# Patient Record
Sex: MALE | Race: BLACK OR AFRICAN AMERICAN | HISPANIC OR LATINO | Employment: FULL TIME | URBAN - METROPOLITAN AREA
[De-identification: names, ages, dates, MRNs, and addresses within clinical notes are randomized per-mention and may not be internally consistent; named-entity substitution may affect disease eponyms.]

---

## 2017-02-17 ENCOUNTER — OFFICE VISIT (OUTPATIENT)
Dept: UROLOGY | Facility: CLINIC | Age: 27
End: 2017-02-17
Payer: COMMERCIAL

## 2017-02-17 VITALS
DIASTOLIC BLOOD PRESSURE: 65 MMHG | TEMPERATURE: 99 F | HEART RATE: 70 BPM | BODY MASS INDEX: 29.25 KG/M2 | SYSTOLIC BLOOD PRESSURE: 119 MMHG | HEIGHT: 66 IN | WEIGHT: 182 LBS

## 2017-02-17 DIAGNOSIS — R06.02 SHORTNESS OF BREATH: ICD-10-CM

## 2017-02-17 DIAGNOSIS — R30.0 DYSURIA: ICD-10-CM

## 2017-02-17 DIAGNOSIS — N53.19 EJACULATORY DISORDER: ICD-10-CM

## 2017-02-17 DIAGNOSIS — N50.819 ORCHALGIA: ICD-10-CM

## 2017-02-17 DIAGNOSIS — N32.81 OAB (OVERACTIVE BLADDER): Primary | ICD-10-CM

## 2017-02-17 PROCEDURE — 99204 OFFICE O/P NEW MOD 45 MIN: CPT | Mod: S$GLB,,, | Performed by: UROLOGY

## 2017-02-17 PROCEDURE — 87086 URINE CULTURE/COLONY COUNT: CPT

## 2017-02-17 PROCEDURE — 87591 N.GONORRHOEAE DNA AMP PROB: CPT

## 2017-02-17 PROCEDURE — 99999 PR PBB SHADOW E&M-EST. PATIENT-LVL III: CPT | Mod: PBBFAC,,, | Performed by: UROLOGY

## 2017-02-17 RX ORDER — PHENAZOPYRIDINE HYDROCHLORIDE 95 MG/1
95 TABLET ORAL 3 TIMES DAILY PRN
COMMUNITY

## 2017-02-17 RX ORDER — OXYBUTYNIN CHLORIDE 5 MG/1
5 TABLET ORAL 3 TIMES DAILY
Qty: 90 TABLET | Refills: 12 | Status: SHIPPED | OUTPATIENT
Start: 2017-02-17 | End: 2017-02-17 | Stop reason: SDUPTHER

## 2017-02-17 RX ORDER — OXYBUTYNIN CHLORIDE 5 MG/1
5 TABLET ORAL 3 TIMES DAILY
Qty: 90 TABLET | Refills: 12 | Status: SHIPPED | OUTPATIENT
Start: 2017-02-17 | End: 2017-03-31 | Stop reason: SDUPTHER

## 2017-02-17 NOTE — PROGRESS NOTES
HPI:  Zulay Vanegas is a 26 y.o. year old male that  presents with No chief complaint on file.  .  This patient self refers with bladder issues wanting to be cystoscoped.  Patient speaks no English and communication is a with the help of  Catia the .  Patient was seen in the emergency room in August of last year with pain and gastritis dysuria and it was recommended that he proceed with cystoscopy.  He also states he was seen at [facility] and also cystoscopy was recommended.  CT scan from August of last year is reviewed which shows normal kidneys.  This image is reviewed by me and I agree that his kidneys appear normal    Patient's chief complaint is frequency and nocturia.  He states that sometimes he can void up to 50 times per day.  He states that this is been going on for 8 months.  He has occasional dysuria and no fevers or chills.  He denies gross hematuria or history of urinary tract infection.  Patient brings in a picture of a prescription for tamsulosin which he states he tried but which did not help his voiding symptoms.  His girlfriend states that oftentimes his symptoms are made worse with certain foods including alcohol and coffee.    Complains of an 8 month history of intermittent fleeting pain that goes one testicle to the other.  It is not associated with swelling    Also complains of intermittent pain with ejaculation and also intermittent lack of ejaculation.     Chart review shows a GFR greater than 60 in August of last year with a negative urine culture at that time.  Patient brings in recent TD testing which is negative for GC RPR and  hepatitis      Past Medical History   Diagnosis Date    Allergy      currently has a rash but does not know where it comes from    Cervical strain     Elevated PSA     Gastritis      Social History     Social History    Marital status: Single     Spouse name: N/A    Number of children: N/A    Years of education: N/A  "    Occupational History    Not on file.     Social History Main Topics    Smoking status: Former Smoker    Smokeless tobacco: Never Used      Comment: 2 months ago    Alcohol use No    Drug use: No    Sexual activity: Yes     Partners: Female     Birth control/ protection: None     Other Topics Concern    Not on file     Social History Narrative     History reviewed. No pertinent past surgical history.  Family History   Problem Relation Age of Onset    No Known Problems Mother     No Known Problems Father     Prostate cancer Neg Hx     Kidney disease Neg Hx            Review of Systems  The patient has no chest pains.  The patient has  shortness of breath  Patient wears glasses.  All other review of systems are negative.      Physical Exam:  Visit Vitals    /65    Pulse 70    Temp 98.5 °F (36.9 °C)    Ht 5' 6" (1.676 m)    Wt 82.6 kg (182 lb)    BMI 29.38 kg/m2     General appearance: alert, cooperative, no distress  Constitutional:Oriented to person, place, and time.appears well-developed and well-nourished.   HEENT: Normocephalic, atraumatic, neck symmetrical, no nasal discharge   Eyes: conjunctivae/corneas clear, PERRL, EOM's intact  Lungs: clear to auscultation bilaterally, no dullness to percussion bilaterally  Heart: regular rate and rhythm without rub; no displacement of the PMI   Abdomen: soft, non-tender; bowel sounds normoactive; no organomegaly  :Penis/perineum without lesions, scrotum without rash/cysts, epididymis nontender bilaterally, urethral meatus in normal location normal size, no penile plaques palpated, prostate smoothly enlarged, no nodules, seminal vesicles not palpated.  No rectal masses, sphincter tone normal.  Testes equal in size without masses  Extremities: extremities symmetric; no clubbing, cyanosis, or edema  Integument: Skin color, texture, turgor normal; no rashes; hair distrubution normal  Neurologic: Alert and oriented X 3, normal strength, normal " coordination and gait  Psychiatric: no pressured speech; normal affect; no evidence of impaired cognition     LABS:    Complete Blood Count  Lab Results   Component Value Date    RBC 4.44 (L) 08/16/2016    HGB 13.8 (L) 08/16/2016    HCT 40.8 08/16/2016    MCV 92 08/16/2016    MCH 31.1 (H) 08/16/2016    MCHC 33.8 08/16/2016    RDW 12.8 08/16/2016     08/16/2016    MPV 10.3 08/16/2016    GRAN 2.7 08/16/2016    GRAN 57.5 08/16/2016    LYMPH 1.5 08/16/2016    LYMPH 32.4 08/16/2016    MONO 0.4 08/16/2016    MONO 9.0 08/16/2016    EOS 0.0 08/16/2016    BASO 0.01 08/16/2016    EOSINOPHIL 0.9 08/16/2016    BASOPHIL 0.2 08/16/2016    DIFFMETHOD Automated 08/16/2016       Comprehensive Metabolic Panel  Lab Results   Component Value Date     08/16/2016    BUN 14 08/16/2016    CREATININE 1.1 08/16/2016     08/16/2016    K 3.8 08/16/2016     08/16/2016    PROT 7.2 08/16/2016    ALBUMIN 3.8 08/16/2016    BILITOT 0.2 08/16/2016    AST 15 08/16/2016    ALKPHOS 98 08/16/2016    CO2 27 08/16/2016    ALT 14 08/16/2016    ANIONGAP 10 08/16/2016    EGFRNONAA >60 08/16/2016    ESTGFRAFRICA >60 08/16/2016       PSA  No results found for: PSA      Assessment:    ICD-10-CM ICD-9-CM    1. OAB (overactive bladder) N32.81 596.51 Cystoscopy   2. Dysuria R30.0 788.1 Urine culture      C. trachomatis/N. gonorrhoeae by AMP DNA Urethra   3. Orchalgia N50.819 608.9    4. Ejaculatory disorder N53.19 608.89    5. Shortness of breath R06.02 786.05      The primary encounter diagnosis was OAB (overactive bladder). Diagnoses of Dysuria, Orchalgia, Ejaculatory disorder, and Shortness of breath were also pertinent to this visit.      Plan: Impression 1 Probable overactive bladder versus psychogenic voiding dysfunction.  I discussed at length in detail with the patient and his girlfriend that in his young age, most likely his symptoms are psychogenic.  Patient does not believe that this is so and wants cystoscopy which will be  performed at next available time slot.  Discussed risk and benefits of anticholinergic and patient wants to give it a try.Prescription for oxybutynin 5 mg is given to the patient.  Possible side effects including dry mouth, constipation, and difficulty voiding were discussed.  Patient will titrate to up to a maximum 5 mg 3 times a day and adjust downward as needed according to side effects.  Patient voices understanding    #2 dysuria, intermittent.  Urine will be cultured and we'll contact the patient with any significant finding.  Patient requests urethral swab test for chlamydia which is done    #3 testicular pain.Testicular pain.  Plan.  I discussed at length and in detail with the patient that it is often difficult to explain the exact cause of testicular pain.  I reassured the patient that his exam shows no masses.  I recommend that he use Tylenol or Motrin and consider using a jockstrap or jockey type underwear.    #4 intermittent ejaculatory symptoms.  I discussed with patient that these are most likely psychogenic in origin and that I cannot otherwise explain them.  No specific therapy for this entity    #5 shortness of breath.  Plan.  Patient recommended follow-up with his PCP concerning this  Orders Placed This Encounter   Procedures    Cystoscopy    Urine culture    C. trachomatis/N. gonorrhoeae by AMP DNA Urethra           Alex Valentin MD

## 2017-02-19 LAB — BACTERIA UR CULT: NO GROWTH

## 2017-02-20 LAB
C TRACH DNA SPEC QL NAA+PROBE: NEGATIVE
N GONORRHOEA DNA SPEC QL NAA+PROBE: NEGATIVE

## 2017-03-31 ENCOUNTER — PROCEDURE VISIT (OUTPATIENT)
Dept: UROLOGY | Facility: CLINIC | Age: 27
End: 2017-03-31
Payer: COMMERCIAL

## 2017-03-31 VITALS
HEIGHT: 66 IN | HEART RATE: 69 BPM | BODY MASS INDEX: 29.25 KG/M2 | TEMPERATURE: 98 F | WEIGHT: 182 LBS | DIASTOLIC BLOOD PRESSURE: 79 MMHG | SYSTOLIC BLOOD PRESSURE: 131 MMHG

## 2017-03-31 DIAGNOSIS — N32.81 OAB (OVERACTIVE BLADDER): ICD-10-CM

## 2017-03-31 DIAGNOSIS — R30.0 DYSURIA: Primary | ICD-10-CM

## 2017-03-31 PROCEDURE — 52000 CYSTOURETHROSCOPY: CPT | Mod: S$GLB,,, | Performed by: UROLOGY

## 2017-03-31 PROCEDURE — 99214 OFFICE O/P EST MOD 30 MIN: CPT | Mod: 25,S$GLB,, | Performed by: UROLOGY

## 2017-03-31 RX ORDER — PHENAZOPYRIDINE HYDROCHLORIDE 200 MG/1
200 TABLET, FILM COATED ORAL 3 TIMES DAILY PRN
Qty: 30 TABLET | Refills: 1 | Status: SHIPPED | OUTPATIENT
Start: 2017-03-31 | End: 2017-04-10

## 2017-03-31 RX ORDER — OXYBUTYNIN CHLORIDE 5 MG/1
5 TABLET ORAL 3 TIMES DAILY
Qty: 90 TABLET | Refills: 12 | Status: SHIPPED | OUTPATIENT
Start: 2017-03-31 | End: 2017-04-30

## 2017-03-31 NOTE — PROCEDURES
Procedures Procedures: Flexible cystourethroscopy    Pre Procedure Diagnosis: Overactive bladder    Post Procedure Diagnosis: Same    Date of Procedure.  mArch 31st 2017    Indications.  This gentleman with daytime frequency up to 50 presents now for evaluation.  She has had an IVP which was normal    Surgeon: Alex Valentin MD    Anesthesia: 2% uro-jet lidocaine jelly for local analgesia    Flexible cysto-urethroscopy was performed after consent was obtained.    2% lidocaine urojet was used for local analgesia.  The genitalia were prepped and draped in the usual sterile fashion.    The flexible scope was advanced into the urethra and into the bladder.  Bilateral ureteral orifices were evaluated and noted to be normal with clear efflux.  The bladder was completely surveyed in a systematic fashion.   No bladder tumors or lesions were seen.  No strictures were noted.  The prostate showed no evidence of obstruction    The patient tolerated the procedure well without complication.    Impression overactive bladder.  Patient has been tried on oxybutynin which helps somewhat.  He still has significant frequency.  Oxybutynin is renewed.  I discussed at length and in detail with the patient and his girlfriend that his urologic workup has been normal.  Discussed that was my feeling that his symptoms are most likely psychogenic in origin and that he see either a psychologist or psychiatrist.  Patient and his girlfriend voice understanding of my impression and they state they might seek a urologic second opinion.  Follow-up with me on an as-needed basis    #2 intermittent dysuria.  Plan.  I discussed that his urine culture has been negative.  Prescription for Pyridium given.  Discussion and recommendation as detailed above.    Patient had girlfriend speaks minimal English and communication is helped with the [facility]. Today I spent 25 minutes with the patient, more than 50% of which was spent counseling  "and discussion as detailed above.    Physical exam reveals reveals a well-developed well-nourished patient  in no acute distress.  Patient is alert and oriented ×3 with normal mood and affect.  Respiratory effort is normal and there is no peripheral edema.  Skin is normal to inspection and palpation.Penis/perineum without lesions, scrotum without rash/cysts, epididymis nontender bilaterally, urethral meatus in normal location normal size, no penile plaques palpated, prostate smoothly enlarged, no nodules, seminal vesicles not palpated.  No rectal masses, sphincter tone normal.  Testes equal in size without masses    /79  Pulse 69  Temp 98.2 °F (36.8 °C)  Ht 5' 6" (1.676 m)  Wt 82.6 kg (182 lb)  BMI 29.38 kg/m2  Review of Systems  General ROS: negative for chills, fever or weight loss  Respiratory ROS: no cough, shortness of breath, or wheezing  Cardiovascular ROS: no chest pain or dyspnea on exertion  Musculoskeletal ROS: negative for gait disturbance or muscular weakness    Family History   Problem Relation Age of Onset    No Known Problems Mother     No Known Problems Father     Prostate cancer Neg Hx     Kidney disease Neg Hx      Past Medical History:   Diagnosis Date    Allergy     currently has a rash but does not know where it comes from    Cervical strain     Elevated PSA     Gastritis      Family History   Problem Relation Age of Onset    No Known Problems Mother     No Known Problems Father     Prostate cancer Neg Hx     Kidney disease Neg Hx      Social History   Substance Use Topics    Smoking status: Former Smoker    Smokeless tobacco: Never Used      Comment: 2 months ago    Alcohol use No       Impression:      ICD-10-CM ICD-9-CM    1. Dysuria R30.0 788.1    2. OAB (overactive bladder) N32.81 596.51 Cystoscopy                 "

## 2021-08-23 ENCOUNTER — HOSPITAL ENCOUNTER (EMERGENCY)
Facility: HOSPITAL | Age: 31
Discharge: HOME | End: 2021-08-23
Attending: EMERGENCY MEDICINE
Payer: MEDICAID

## 2021-08-23 VITALS
WEIGHT: 210 LBS | DIASTOLIC BLOOD PRESSURE: 80 MMHG | BODY MASS INDEX: 32.96 KG/M2 | RESPIRATION RATE: 20 BRPM | HEART RATE: 110 BPM | OXYGEN SATURATION: 99 % | TEMPERATURE: 98 F | HEIGHT: 67 IN | SYSTOLIC BLOOD PRESSURE: 130 MMHG

## 2021-08-23 DIAGNOSIS — U07.1 COVID-19 VIRUS DETECTED: ICD-10-CM

## 2021-08-23 DIAGNOSIS — U07.1 COVID-19: ICD-10-CM

## 2021-08-23 DIAGNOSIS — R05.9 COUGH: Primary | ICD-10-CM

## 2021-08-23 LAB
CTP QC/QA: YES
SARS-COV-2 RDRP RESP QL NAA+PROBE: POSITIVE

## 2021-08-23 PROCEDURE — 25000003 PHARM REV CODE 250: Performed by: EMERGENCY MEDICINE

## 2021-08-23 PROCEDURE — U0002 COVID-19 LAB TEST NON-CDC: HCPCS | Performed by: PHYSICIAN ASSISTANT

## 2021-08-23 PROCEDURE — 99284 EMERGENCY DEPT VISIT MOD MDM: CPT | Mod: 25

## 2021-08-23 RX ORDER — ONDANSETRON 4 MG/1
4 TABLET, FILM COATED ORAL EVERY 6 HOURS
Qty: 20 TABLET | Refills: 0 | Status: SHIPPED | OUTPATIENT
Start: 2021-08-23

## 2021-08-23 RX ORDER — ALBUTEROL SULFATE 90 UG/1
1-2 AEROSOL, METERED RESPIRATORY (INHALATION) EVERY 6 HOURS PRN
Qty: 8 G | Refills: 0 | Status: SHIPPED | OUTPATIENT
Start: 2021-08-23 | End: 2022-08-23

## 2021-08-23 RX ORDER — BENZONATATE 100 MG/1
100 CAPSULE ORAL ONCE
Status: COMPLETED | OUTPATIENT
Start: 2021-08-23 | End: 2021-08-23

## 2021-08-23 RX ORDER — BENZONATATE 100 MG/1
100 CAPSULE ORAL 3 TIMES DAILY PRN
Qty: 20 CAPSULE | Refills: 0 | Status: SHIPPED | OUTPATIENT
Start: 2021-08-23 | End: 2021-09-02

## 2021-08-23 RX ADMIN — BENZONATATE 100 MG: 100 CAPSULE ORAL at 12:08

## 2022-12-07 ENCOUNTER — HOSPITAL ENCOUNTER (EMERGENCY)
Facility: HOSPITAL | Age: 32
Discharge: HOME | End: 2022-12-07
Attending: STUDENT IN AN ORGANIZED HEALTH CARE EDUCATION/TRAINING PROGRAM
Payer: MEDICAID

## 2022-12-07 VITALS
DIASTOLIC BLOOD PRESSURE: 85 MMHG | WEIGHT: 224 LBS | BODY MASS INDEX: 35.08 KG/M2 | RESPIRATION RATE: 17 BRPM | OXYGEN SATURATION: 97 % | TEMPERATURE: 98 F | HEART RATE: 66 BPM | SYSTOLIC BLOOD PRESSURE: 127 MMHG

## 2022-12-07 DIAGNOSIS — R00.2 PALPITATIONS: ICD-10-CM

## 2022-12-07 DIAGNOSIS — N17.9 AKI (ACUTE KIDNEY INJURY): Primary | ICD-10-CM

## 2022-12-07 DIAGNOSIS — R00.2 PALPITATION: ICD-10-CM

## 2022-12-07 LAB
ALBUMIN SERPL BCP-MCNC: 3.9 G/DL (ref 3.5–5.2)
ALP SERPL-CCNC: 96 U/L (ref 55–135)
ALT SERPL W/O P-5'-P-CCNC: 53 U/L (ref 10–44)
ANION GAP SERPL CALC-SCNC: 10 MMOL/L (ref 8–16)
AST SERPL-CCNC: 27 U/L (ref 10–40)
BASOPHILS # BLD AUTO: 0.03 K/UL (ref 0–0.2)
BASOPHILS NFR BLD: 0.4 % (ref 0–1.9)
BILIRUB SERPL-MCNC: 0.3 MG/DL (ref 0.1–1)
BILIRUB UR QL STRIP: NEGATIVE
BUN SERPL-MCNC: 17 MG/DL (ref 6–20)
CALCIUM SERPL-MCNC: 9.4 MG/DL (ref 8.7–10.5)
CHLORIDE SERPL-SCNC: 106 MMOL/L (ref 95–110)
CLARITY UR: CLEAR
CO2 SERPL-SCNC: 24 MMOL/L (ref 23–29)
COLOR UR: YELLOW
CREAT SERPL-MCNC: 1.5 MG/DL (ref 0.5–1.4)
DIFFERENTIAL METHOD: NORMAL
EOSINOPHIL # BLD AUTO: 0 K/UL (ref 0–0.5)
EOSINOPHIL NFR BLD: 0.6 % (ref 0–8)
ERYTHROCYTE [DISTWIDTH] IN BLOOD BY AUTOMATED COUNT: 12.2 % (ref 11.5–14.5)
EST. GFR  (NO RACE VARIABLE): >60 ML/MIN/1.73 M^2
GLUCOSE SERPL-MCNC: 110 MG/DL (ref 70–110)
GLUCOSE UR QL STRIP: NEGATIVE
HCT VFR BLD AUTO: 43.5 % (ref 40–54)
HGB BLD-MCNC: 14.6 G/DL (ref 14–18)
HGB UR QL STRIP: NEGATIVE
IMM GRANULOCYTES # BLD AUTO: 0.02 K/UL (ref 0–0.04)
IMM GRANULOCYTES NFR BLD AUTO: 0.3 % (ref 0–0.5)
KETONES UR QL STRIP: NEGATIVE
LEUKOCYTE ESTERASE UR QL STRIP: NEGATIVE
LYMPHOCYTES # BLD AUTO: 3.1 K/UL (ref 1–4.8)
LYMPHOCYTES NFR BLD: 44.4 % (ref 18–48)
MCH RBC QN AUTO: 30.4 PG (ref 27–31)
MCHC RBC AUTO-ENTMCNC: 33.6 G/DL (ref 32–36)
MCV RBC AUTO: 91 FL (ref 82–98)
MONOCYTES # BLD AUTO: 0.5 K/UL (ref 0.3–1)
MONOCYTES NFR BLD: 6.5 % (ref 4–15)
NEUTROPHILS # BLD AUTO: 3.3 K/UL (ref 1.8–7.7)
NEUTROPHILS NFR BLD: 47.8 % (ref 38–73)
NITRITE UR QL STRIP: NEGATIVE
NRBC BLD-RTO: 0 /100 WBC
PH UR STRIP: 7 [PH] (ref 5–8)
PLATELET # BLD AUTO: 260 K/UL (ref 150–450)
PMV BLD AUTO: 10.1 FL (ref 9.2–12.9)
POTASSIUM SERPL-SCNC: 3.8 MMOL/L (ref 3.5–5.1)
PROT SERPL-MCNC: 7.7 G/DL (ref 6–8.4)
PROT UR QL STRIP: ABNORMAL
RBC # BLD AUTO: 4.8 M/UL (ref 4.6–6.2)
SODIUM SERPL-SCNC: 140 MMOL/L (ref 136–145)
SP GR UR STRIP: 1.03 (ref 1–1.03)
T4 FREE SERPL-MCNC: 0.94 NG/DL (ref 0.71–1.51)
TSH SERPL DL<=0.005 MIU/L-ACNC: 4.38 UIU/ML (ref 0.4–4)
URN SPEC COLLECT METH UR: ABNORMAL
UROBILINOGEN UR STRIP-ACNC: NEGATIVE EU/DL
WBC # BLD AUTO: 6.91 K/UL (ref 3.9–12.7)

## 2022-12-07 PROCEDURE — 93010 EKG 12-LEAD: ICD-10-PCS | Mod: ,,, | Performed by: INTERNAL MEDICINE

## 2022-12-07 PROCEDURE — 93010 ELECTROCARDIOGRAM REPORT: CPT | Mod: ,,, | Performed by: INTERNAL MEDICINE

## 2022-12-07 PROCEDURE — 81003 URINALYSIS AUTO W/O SCOPE: CPT | Performed by: STUDENT IN AN ORGANIZED HEALTH CARE EDUCATION/TRAINING PROGRAM

## 2022-12-07 PROCEDURE — 84439 ASSAY OF FREE THYROXINE: CPT | Performed by: PHYSICIAN ASSISTANT

## 2022-12-07 PROCEDURE — 25000003 PHARM REV CODE 250: Performed by: STUDENT IN AN ORGANIZED HEALTH CARE EDUCATION/TRAINING PROGRAM

## 2022-12-07 PROCEDURE — 84443 ASSAY THYROID STIM HORMONE: CPT | Performed by: PHYSICIAN ASSISTANT

## 2022-12-07 PROCEDURE — 85025 COMPLETE CBC W/AUTO DIFF WBC: CPT | Performed by: PHYSICIAN ASSISTANT

## 2022-12-07 PROCEDURE — 93005 ELECTROCARDIOGRAM TRACING: CPT

## 2022-12-07 PROCEDURE — 96360 HYDRATION IV INFUSION INIT: CPT

## 2022-12-07 PROCEDURE — 99285 EMERGENCY DEPT VISIT HI MDM: CPT | Mod: 25

## 2022-12-07 PROCEDURE — 80053 COMPREHEN METABOLIC PANEL: CPT | Performed by: PHYSICIAN ASSISTANT

## 2022-12-07 RX ADMIN — SODIUM CHLORIDE 1000 ML: 0.9 INJECTION, SOLUTION INTRAVENOUS at 10:12

## 2022-12-08 NOTE — ED PROVIDER NOTES
Encounter Date: 12/7/2022       History     Chief Complaint   Patient presents with    Palpitations     Patient reports heart palpitations x 2 episodes. First episode was last night. Woke him out of his sleep. 2nd episode was when he was laying down tonight. Denies cardiac history. Denies hx of anxiety. Denies SOB or N/V when palpitations are present. Denies any increase in caffeine consumption. Denies drug use. Social drinker.      32-year-old male with no significant past medical history presents with palpitations and yesterday.  He reports 1st episode occurred while he was sleeping yesterday night awoke and with the heart racing.  At the time he got up and walked and symptoms improved.  He reports a similar episode occurred today while lying down so presented to the emergency department for evaluation.  Denies any chest pain during the episodes.  Denies any shortness of breath nausea or vomiting.  Denies any prior history of similar symptoms.  He states a week ago being nausea vomiting and diarrhea for approximately 3-4 a self-resolved.  It was a viral infection as his son had similar symptoms.  Currently denies any dysuria or hematuria.  Denies any fevers or chills.  Denies any illicit drug use.    The history is limited by a language barrier. A  was used ([id number]).   Review of patient's allergies indicates:  No Known Allergies  Past Medical History:   Diagnosis Date    Allergy     currently has a rash but does not know where it comes from    Cervical strain     Elevated PSA     Gastritis      No past surgical history on file.  Family History   Problem Relation Age of Onset    No Known Problems Mother     No Known Problems Father     Prostate cancer Neg Hx     Kidney disease Neg Hx      Social History     Tobacco Use    Smoking status: Former    Smokeless tobacco: Never    Tobacco comments:     2 months ago   Substance Use Topics    Alcohol use: No    Drug use: No     Review of Systems    Constitutional:  Negative for chills and fever.   HENT:  Negative for congestion and rhinorrhea.    Eyes:  Negative for pain.   Respiratory:  Negative for cough and shortness of breath.    Cardiovascular:  Positive for palpitations. Negative for chest pain and leg swelling.   Gastrointestinal:  Negative for abdominal pain, nausea and vomiting.   Endocrine: Negative for polyuria.   Genitourinary:  Negative for dysuria and hematuria.   Musculoskeletal:  Negative for gait problem and neck pain.   Skin:  Negative for rash.   Allergic/Immunologic: Negative for immunocompromised state.   Neurological:  Positive for dizziness. Negative for weakness and headaches.     Physical Exam     Initial Vitals   BP Pulse Resp Temp SpO2   12/07/22 2039 12/07/22 2039 12/07/22 2039 12/07/22 2045 12/07/22 2039   (!) 168/94 72 18 98.2 °F (36.8 °C) 99 %      MAP       --                Physical Exam    Constitutional: He appears well-developed and well-nourished. He is not diaphoretic. No distress.   HENT:   Head: Normocephalic and atraumatic.   Eyes: Conjunctivae and EOM are normal. Pupils are equal, round, and reactive to light.   Neck: No JVD present.   Normal range of motion.  Cardiovascular:  Normal rate and regular rhythm.           Pulses:       Radial pulses are 2+ on the right side and 2+ on the left side.        Posterior tibial pulses are 2+ on the right side and 2+ on the left side.   Pulmonary/Chest: Breath sounds normal. No respiratory distress.   Abdominal: Abdomen is soft. Bowel sounds are normal. He exhibits no distension. There is no abdominal tenderness. There is no rebound and no guarding.   Musculoskeletal:         General: No tenderness or edema. Normal range of motion.      Cervical back: Normal range of motion.     Lymphadenopathy:     He has no cervical adenopathy.   Neurological: He is alert and oriented to person, place, and time.   Skin: Skin is warm. Capillary refill takes less than 2 seconds.   Psychiatric:  He has a normal mood and affect.       ED Course   Procedures  Labs Reviewed   COMPREHENSIVE METABOLIC PANEL - Abnormal; Notable for the following components:       Result Value    Creatinine 1.5 (*)     ALT 53 (*)     All other components within normal limits   TSH - Abnormal; Notable for the following components:    TSH 4.377 (*)     All other components within normal limits   URINALYSIS, REFLEX TO URINE CULTURE - Abnormal; Notable for the following components:    Protein, UA Trace (*)     All other components within normal limits    Narrative:     Specimen Source->Urine   CBC W/ AUTO DIFFERENTIAL   T4, FREE     EKG Readings: (Independently Interpreted)   EKG obtained sinus rhythm with a ventricular rate of 69. No Signs of ischemia.  Normal intervals.     Imaging Results              X-Ray Chest AP Portable (Final result)  Result time 12/07/22 23:02:52      Final result by Bulmaro Mcdonald MD (12/07/22 23:02:52)                   Impression:      No acute process.      Electronically signed by: Bulmaro Mcdonald MD  Date:    12/07/2022  Time:    23:02               Narrative:    EXAMINATION:  XR CHEST AP PORTABLE    CLINICAL HISTORY:  Palpitations    TECHNIQUE:  Single frontal view of the chest was performed.    COMPARISON:  08/23/2021.    FINDINGS:  Monitoring EKG leads are present.  The trachea is unremarkable. The cardiomediastinal silhouette is within normal limits.  The hemidiaphragms are unremarkable.  There are no pleural effusions.  There is no evidence of a pneumothorax.  There is no evidence of pneumomediastinum.  No airspace opacity is present.  The osseous structures are unremarkable.                                       Medications   sodium chloride 0.9% bolus 1,000 mL (0 mLs Intravenous Stopped 12/7/22 7880)     Medical Decision Making:   Initial Assessment:   32-year-old male with no significant past medical history presents with palpitations and yesterday.  Patient no acute distress, EKG without any signs  of ischemia or arrhythmia.   Exam without any signs of volume overload.  Symptoms may be secondary to dehydration given recent diagnosis of acute gastroenteritis versus panic attack.  Differential for palpitations include cardiopulmonary causes such as ischemia, PE, pneumothorax, and pneumonia,   Plan: labs, EKG, CXR, troponin  Clinical Tests:   Lab Tests: Ordered and Reviewed  Radiological Study: Reviewed and Ordered  Medical Tests: Ordered and Reviewed           ED Course as of 12/08/22 0140   Wed Dec 07, 2022   2320 CBC without significant leukocytosis, anemia, or platelet abnormalities.  Chemistry notable for creatinine of 1.5 likely due to dehydration given patient had a viral gastroenteritis a week ago.  And have patient follow-up with PCP. Pt is currently stable for discharge. I discussed with the patient/family the diagnosis, treatment plan, indications for return to the emergency department, and for expected follow-up. The patient/family verbalized an understanding. The patient/family is asked if there are any questions or concerns. We discuss the case, until all issues are addressed to the patient/family's satisfaction. Patient/family understands and is agreeable to the plan.   [AS]      ED Course User Index  [AS] Shayla De La O MD                 Clinical Impression:   Final diagnoses:  [R00.2] Palpitations  [R00.2] Palpitation  [N17.9] KYLE (acute kidney injury) (Primary)        ED Disposition Condition    Discharge Stable          ED Prescriptions    None       Follow-up Information       Follow up With Specialties Details Why Contact Info    Primary care doctor   For re-evaluation of creatinine              Shayla De La O MD  12/08/22 0140

## 2022-12-08 NOTE — FIRST PROVIDER EVALUATION
Emergency Department TeleTriage Encounter Note      CHIEF COMPLAINT    Chief Complaint   Patient presents with    Palpitations     Patient reports heart palpitations x 2 episodes. First episode was last night. Woke him out of his sleep. 2nd episode was when he was laying down tonight. Denies cardiac history. Denies hx of anxiety. Denies SOB or N/V when palpitations are present. Denies any increase in caffeine consumption. Denies drug use. Social drinker.        VITAL SIGNS   Initial Vitals   BP Pulse Resp Temp SpO2   12/07/22 2039 12/07/22 2039 12/07/22 2039 12/07/22 2045 12/07/22 2039   (!) 168/94 72 18 98.2 °F (36.8 °C) 99 %      MAP       --                   ALLERGIES    Review of patient's allergies indicates:  No Known Allergies    PROVIDER TRIAGE NOTE  This is a teletriage evaluation of a 32 y.o. male presenting to the ED complaining of palpitations.     Initial orders will be placed and care will be transferred to an alternate provider when patient is roomed for a full evaluation. Any additional orders and the final disposition will be determined by that provider.         ORDERS  Labs Reviewed   CBC W/ AUTO DIFFERENTIAL   COMPREHENSIVE METABOLIC PANEL   TSH       ED Orders (720h ago, onward)      Start Ordered     Status Ordering Provider    12/07/22 2117 12/07/22 2116  Pulse Oximetry Continuous  Continuous         Ordered VALERIE PARR    12/07/22 2116 12/07/22 2116  Saline lock IV  Once         Ordered VALERIE PARR.    12/07/22 2116 12/07/22 2116  CBC auto differential  STAT         Ordered VALERIE PARR.    12/07/22 2116 12/07/22 2116  Comprehensive metabolic panel  STAT         Ordered VALERIE PARR.    12/07/22 2116 12/07/22 2116  TSH  STAT         Ordered VALERIE PARR.    12/07/22 2116 12/07/22 2116  Cardiac Monitoring - Adult  Continuous        Comments: Notify Physician If:    Ordered VALERIE PARR    12/07/22  2046 12/07/22 2045  EKG 12-lead  Once         Completed by ANTONIO PARADA on 12/7/2022 at  8:46 PM AMELIA YOUSSEF              Virtual Visit Note: The provider triage portion of this emergency department evaluation and documentation was performed via [employer], a HIPAA-compliant telemedicine application, in concert with a tele-presenter in the room. A face to face patient evaluation with one of my colleagues will occur once the patient is placed in an emergency department room.      DISCLAIMER: This note was prepared with [employer] voice recognition transcription software. Garbled syntax, mangled pronouns, and other bizarre constructions may be attributed to that software system.

## 2022-12-08 NOTE — DISCHARGE INSTRUCTIONS
Thank you for coming to our Emergency Department today. It is important to remember that some problems are difficult to diagnose and may not be found during your first visit. Be sure to follow up with your primary care doctor and review any labs/imaging that was performed with them. If you do not have a primary care doctor, you may contact the one listed on your discharge paperwork or you may also call the [facility] Appointment Desk at [phone] to schedule an appointment with one.     All medications may potentially have side effects and it is impossible to predict which medications may give you side effects. If you feel that you are having a negative effect of any medication you should immediately stop taking them and seek medical attention.    Return to the ER with any questions/concerns, new/concerning symptoms, worsening or failure to improve. Do not drive or make any important decisions for 24 hours if you have received any pain medications, sedatives or mood altering drugs during your ER visit.

## 2024-10-07 ENCOUNTER — HOSPITAL ENCOUNTER (EMERGENCY)
Facility: HOSPITAL | Age: 34
Discharge: HOME | End: 2024-10-07
Attending: EMERGENCY MEDICINE

## 2024-10-07 VITALS
BODY MASS INDEX: 28.41 KG/M2 | WEIGHT: 181 LBS | OXYGEN SATURATION: 98 % | TEMPERATURE: 98 F | HEIGHT: 67 IN | DIASTOLIC BLOOD PRESSURE: 88 MMHG | HEART RATE: 60 BPM | SYSTOLIC BLOOD PRESSURE: 134 MMHG | RESPIRATION RATE: 17 BRPM

## 2024-10-07 DIAGNOSIS — G43.909 MIGRAINE WITHOUT STATUS MIGRAINOSUS, NOT INTRACTABLE, UNSPECIFIED MIGRAINE TYPE: Primary | ICD-10-CM

## 2024-10-07 PROCEDURE — 99284 EMERGENCY DEPT VISIT MOD MDM: CPT | Mod: 25

## 2024-10-07 PROCEDURE — 63600175 PHARM REV CODE 636 W HCPCS: Performed by: PHYSICIAN ASSISTANT

## 2024-10-07 PROCEDURE — 25000003 PHARM REV CODE 250: Performed by: PHYSICIAN ASSISTANT

## 2024-10-07 PROCEDURE — 96372 THER/PROPH/DIAG INJ SC/IM: CPT | Performed by: PHYSICIAN ASSISTANT

## 2024-10-07 RX ORDER — DIPHENHYDRAMINE HCL 25 MG
25 CAPSULE ORAL
Status: COMPLETED | OUTPATIENT
Start: 2024-10-07 | End: 2024-10-07

## 2024-10-07 RX ORDER — PROCHLORPERAZINE EDISYLATE 5 MG/ML
10 INJECTION INTRAMUSCULAR; INTRAVENOUS ONCE
Status: COMPLETED | OUTPATIENT
Start: 2024-10-07 | End: 2024-10-07

## 2024-10-07 RX ORDER — ONDANSETRON 4 MG/1
8 TABLET, FILM COATED ORAL EVERY 6 HOURS PRN
Qty: 30 TABLET | Refills: 0 | Status: SHIPPED | OUTPATIENT
Start: 2024-10-07 | End: 2024-11-06

## 2024-10-07 RX ORDER — BUTALBITAL, ACETAMINOPHEN AND CAFFEINE 50; 325; 40 MG/1; MG/1; MG/1
1 TABLET ORAL EVERY 6 HOURS PRN
Qty: 12 TABLET | Refills: 0 | Status: SHIPPED | OUTPATIENT
Start: 2024-10-07 | End: 2024-11-06

## 2024-10-07 RX ORDER — BUTALBITAL, ACETAMINOPHEN AND CAFFEINE 50; 325; 40 MG/1; MG/1; MG/1
1 TABLET ORAL
Status: COMPLETED | OUTPATIENT
Start: 2024-10-07 | End: 2024-10-07

## 2024-10-07 RX ADMIN — DIPHENHYDRAMINE HYDROCHLORIDE 25 MG: 25 CAPSULE ORAL at 02:10

## 2024-10-07 RX ADMIN — PROCHLORPERAZINE EDISYLATE 10 MG: 5 INJECTION INTRAMUSCULAR; INTRAVENOUS at 02:10

## 2024-10-07 RX ADMIN — BUTALBITAL, ACETAMINOPHEN, AND CAFFEINE 1 TABLET: 325; 50; 40 TABLET ORAL at 02:10

## 2024-10-07 NOTE — Clinical Note
"Zulay "Zulay"Romina was seen and treated in our emergency department on 10/7/2024.  He may return to work on 10/09/2024.       If you have any questions or concerns, please don't hesitate to call.      Leon Meeks PA-C"

## 2024-10-07 NOTE — DISCHARGE INSTRUCTIONS
Petra por venir a nuestro Departamento de Emergencias hoy. Es importante recordar que algunos problemas son difíciles de diagnosticar y es posible que no se detecten liang hagen primera visita. Asegúrese de hacer un seguimiento con hagen médico de atención primaria.  Si no tiene сергей, puede comunicarse con el que figura en hagen documentación de soheila o también puede llamar a la [facility] al [phone] para programar yris richy con сергей.    Regrese a la darlene de emergencias con cualquier pregunta / inquietud, síntomas nuevos / preocupantes, empeoramiento o falta de mejora. No conduzca ni tome decisiones importantes liang 24 horas si ha recibido analgésicos, sedantes o fármacos que alteran el estado de ánimo liang hagen visita a la darlene de emergencias.

## 2024-10-07 NOTE — ED PROVIDER NOTES
Encounter Date: 10/7/2024    SCRIBE #1 NOTE: I, Uche Olsonuyen, am scribing for, and in the presence of,  Leon Meeks PA-C.       History     Chief Complaint   Patient presents with    Headache     Temporal and frontal HA since 0300. Hx migraines. Took excedrin which helped temporarily. Then around 1000, the HA came back with associated dizziness, blurred vision, and cramping to LEFT hand. States typically migraines cause the vision disturbances, but never the cramping. Last dose of excedrin was at 0700. States vision has returned to baseline, but continue to have COLINDRES.      34 y.o. male with a PMHx of migraines, presents to the ED for evaluation of a migraine headache that started this morning. Reports of associated nausea, and photophobia. Endorses a hx of similar presentation and states that this is similar to headaches prior. Attempted treatment for symptoms with Excedrin without relief. Denies vomiting or any associated symptoms.     The history is provided by the patient. No  was used.     Review of patient's allergies indicates:  No Known Allergies  Past Medical History:   Diagnosis Date    Allergy     currently has a rash but does not know where it comes from    Cervical strain     Elevated PSA     Gastritis      History reviewed. No pertinent surgical history.  Family History   Problem Relation Name Age of Onset    No Known Problems Mother      No Known Problems Father      Prostate cancer Neg Hx      Kidney disease Neg Hx       Social History     Tobacco Use    Smoking status: Former    Smokeless tobacco: Never    Tobacco comments:     2 months ago   Substance Use Topics    Alcohol use: No    Drug use: No     Review of Systems   Constitutional:  Negative for fever.   HENT:  Negative for congestion, sore throat and trouble swallowing.    Eyes:  Positive for photophobia.   Respiratory:  Negative for cough and shortness of breath.    Cardiovascular:  Negative for chest pain.    Gastrointestinal:  Positive for nausea. Negative for abdominal pain, constipation, diarrhea and vomiting.   Genitourinary:  Negative for dysuria, flank pain, frequency and urgency.   Musculoskeletal:  Negative for back pain.   Skin:  Negative for rash.   Neurological:  Positive for headaches.   All other systems reviewed and are negative.      Physical Exam     Initial Vitals [10/07/24 1420]   BP Pulse Resp Temp SpO2   134/88 60 17 98 °F (36.7 °C) 98 %      MAP       --         Physical Exam    Nursing note and vitals reviewed.  Constitutional: He appears well-developed and well-nourished. He is not diaphoretic. No distress.   HENT:   Head: Normocephalic and atraumatic.   Nose: Nose normal.   Eyes: Conjunctivae and EOM are normal. Right eye exhibits no discharge. Left eye exhibits no discharge.   Neck: No tracheal deviation present. No JVD present.   Normal range of motion.  Cardiovascular:  Normal rate and regular rhythm.           Pulmonary/Chest: No accessory muscle usage or stridor. No tachypnea. No respiratory distress.   Musculoskeletal:         General: Normal range of motion.      Cervical back: Normal range of motion.     Neurological: He is alert and oriented to person, place, and time. He displays no tremor. He displays no seizure activity. Coordination and gait normal.   Skin: Skin is warm and dry. No pallor.         ED Course   Procedures  Labs Reviewed - No data to display       Imaging Results    None          Medications   prochlorperazine injection Soln 10 mg (10 mg Intramuscular Given 10/7/24 1441)   diphenhydrAMINE capsule 25 mg (25 mg Oral Given 10/7/24 1441)   butalbital-acetaminophen-caffeine -40 mg per tablet 1 tablet (1 tablet Oral Given 10/7/24 1441)     Medical Decision Making    This is an emergent evaluation of a 34 y.o. male with a PMHx of migraines presenting to the ED for gradual onset of headache similar to past headaches per patient. Denies traumatic injury, LOC, emesis, and  seizure. Also denies fever, neck rigidity, sinus pain, dental pain, and otalgia. No personal history of cancer. Afebrile. Neurologically intact without focal deficits.      Given trial of medication in ED with complete resolution of symptoms. Remains well appearing. Vitals stable.     Presentation most consistent with acute migraine in this patient with Hx of migraines. No clinical Hx or findings to suggest intracranial hemorrhage or warrant emergent imaging at this time. Not consistent with infectious etiology, including for meningitis, acute bacterial sinusitis, dental abscess, AOM, and mastoiditis. I doubt acute angle closure glaucoma. I considered but have a lower suspicion for neoplastic etiology.     Discharged home with supportive care. Advising follow up with neurology and PCP.     I discussed with the patient the diagnosis, treatment plan, indications for return to the emergency department, and for expected follow-up. The patient verbalized an understanding. The patient is asked if there are any questions or concerns. We discuss the case, until all issues are addressed to the patient's satisfaction. Patient understands and is agreeable to the plan.     Risk  OTC drugs.  Prescription drug management.            Scribe Attestation:   Scribe #1: I performed the above scribed service and the documentation accurately describes the services I performed. I attest to the accuracy of the note.                             I, Leon Meeks PA-C, personally performed the services described in this documentation. All medical record entries made by the scribe were at my direction and in my presence. I have reviewed the chart and agree that the record reflects my personal performance and is accurate and complete.      DISCLAIMER: This note was prepared with [employer] voice recognition transcription software. Garbled syntax, mangled pronouns, and other bizarre constructions may be attributed to that software  system.      Clinical Impression:  Final diagnoses:  [G43.909] Migraine without status migrainosus, not intractable, unspecified migraine type (Primary)          ED Disposition Condition    Discharge Stable          ED Prescriptions       Medication Sig Dispense Start Date End Date Auth. Provider    butalbital-acetaminophen-caffeine -40 mg (FIORICET, ESGIC) -40 mg per tablet Take 1 tablet by mouth every 6 (six) hours as needed for Pain. 12 tablet 10/7/2024 11/6/2024 Leon Meeks PA-C    ondansetron (ZOFRAN) 4 MG tablet Take 2 tablets (8 mg total) by mouth every 6 (six) hours as needed for Nausea. 30 tablet 10/7/2024 11/6/2024 Leon Meeks PA-C          Follow-up Information       Follow up With Specialties Details Why Contact Info    [facility] -  Schedule an appointment as soon as possible for a visit in 1 day For reevaluation, AND to establish primary if you don't have a  [address] [place] [address]  [phone]      [facility] Emergency Medicine Go to  If symptoms worsen or new symptoms develop [address] [facility] [place] [id number] [phone]             Leon Meeks PA-C  10/07/24 [id number]

## 2024-10-07 NOTE — Clinical Note
"Zulay "Maryellen Morleyz was seen and treated in our emergency department on 10/7/2024.  He may return to work on 10/08/2024.       If you have any questions or concerns, please don't hesitate to call.      Karol VALDIVIA    "